# Patient Record
Sex: MALE | Race: WHITE | NOT HISPANIC OR LATINO | URBAN - METROPOLITAN AREA
[De-identification: names, ages, dates, MRNs, and addresses within clinical notes are randomized per-mention and may not be internally consistent; named-entity substitution may affect disease eponyms.]

---

## 2022-04-25 ENCOUNTER — ESTABLISHED PATIENT (OUTPATIENT)
Dept: URBAN - METROPOLITAN AREA CLINIC 21 | Facility: CLINIC | Age: 77
End: 2022-04-25

## 2022-04-25 DIAGNOSIS — H16.223: ICD-10-CM

## 2022-04-25 DIAGNOSIS — H43.813: ICD-10-CM

## 2022-04-25 PROCEDURE — 92014 COMPRE OPH EXAM EST PT 1/>: CPT

## 2022-04-25 PROCEDURE — 68761 CLOSE TEAR DUCT OPENING: CPT

## 2022-04-25 ASSESSMENT — VISUAL ACUITY
OU_CC: 20/50
OD_CC: 20/30-1
OS_CC: 20/20-1

## 2022-04-25 ASSESSMENT — TONOMETRY
OS_IOP_MMHG: 15
OD_IOP_MMHG: 15

## 2022-04-25 NOTE — PROCEDURE NOTE: CLINICAL
PROCEDURE NOTE: Punctal Plugs, Extended Bilateral Lower Lids. Diagnosis: Keratoconjunctivitis Sicca, Not Specified As Sjögren's. Prior to treatment, the risks/benefits/alternatives were discussed. The patient wished to proceed with procedure. Extended duration plugs were inserted. Mir John. Size: 0.3mm Location: * punctum. Patient tolerated procedure well. There were no complications. Post procedure instructions given. Rosa Lucia

## 2022-05-16 ENCOUNTER — ESTABLISHED PATIENT (OUTPATIENT)
Dept: URBAN - METROPOLITAN AREA CLINIC 21 | Facility: CLINIC | Age: 77
End: 2022-05-16

## 2022-05-16 DIAGNOSIS — H25.813: ICD-10-CM

## 2022-05-16 PROCEDURE — 92014 COMPRE OPH EXAM EST PT 1/>: CPT

## 2022-05-16 RX ORDER — LOTEPREDNOL ETABONATE 2 MG/ML: 1 SUSPENSION/ DROPS OPHTHALMIC TWICE A DAY

## 2022-05-16 ASSESSMENT — VISUAL ACUITY
OS_CC: 20/25+1
OD_CC: 20/25-1

## 2022-05-16 ASSESSMENT — TONOMETRY
OD_IOP_MMHG: 16
OS_IOP_MMHG: 17

## 2022-05-16 ASSESSMENT — KERATOMETRY
OS_K2POWER_DIOPTERS: 45.50
OS_AXISANGLE2_DEGREES: 104
OD_K2POWER_DIOPTERS: 46.25
OS_K1POWER_DIOPTERS: 44.00
OS_AXISANGLE_DEGREES: 014
OD_AXISANGLE2_DEGREES: 076
OD_K1POWER_DIOPTERS: 44.75
OD_AXISANGLE_DEGREES: 166

## 2022-06-20 ENCOUNTER — ESTABLISHED PATIENT (OUTPATIENT)
Dept: URBAN - METROPOLITAN AREA CLINIC 21 | Facility: CLINIC | Age: 77
End: 2022-06-20

## 2022-06-20 DIAGNOSIS — H25.813: ICD-10-CM

## 2022-06-20 DIAGNOSIS — H43.813: ICD-10-CM

## 2022-06-20 DIAGNOSIS — H16.223: ICD-10-CM

## 2022-06-20 PROCEDURE — 92014 COMPRE OPH EXAM EST PT 1/>: CPT

## 2022-06-20 ASSESSMENT — VISUAL ACUITY
OS_CC: 20/25-2
OU_CC: 20/30
OD_CC: 20/30-1

## 2022-06-20 ASSESSMENT — KERATOMETRY
OD_K2POWER_DIOPTERS: 46.25
OS_K1POWER_DIOPTERS: 44.00
OD_AXISANGLE_DEGREES: 166
OS_AXISANGLE2_DEGREES: 104
OS_K2POWER_DIOPTERS: 45.50
OS_AXISANGLE_DEGREES: 014
OD_K1POWER_DIOPTERS: 44.75
OD_AXISANGLE2_DEGREES: 076

## 2022-06-20 ASSESSMENT — TONOMETRY
OS_IOP_MMHG: 13
OD_IOP_MMHG: 18

## 2022-06-20 NOTE — PATIENT DISCUSSION
* MRX for glasses AND cls would be needed.  Patient likes wearing contacts more due to comfort and vision being better than glasses.

## 2022-06-20 NOTE — PATIENT DISCUSSION
* Pt will be seeing Dr. Alfred Membreno soon and  ------ can discuss whether or not he should continue wearing contacts due to Motility and memory issues due to Parkinsons.

## 2022-06-20 NOTE — PATIENT DISCUSSION
** The patient came in today wearing contacts-- and glasses.  TWO- THREE contacts were on the right eye today which was what caused the blurred vision and trouble seeing.  Once removed, the patient was able to see as usual.

## 2022-08-18 ENCOUNTER — FOLLOW UP (OUTPATIENT)
Dept: URBAN - METROPOLITAN AREA CLINIC 21 | Facility: CLINIC | Age: 77
End: 2022-08-18

## 2022-08-18 DIAGNOSIS — H52.4: ICD-10-CM

## 2022-08-18 DIAGNOSIS — H25.813: ICD-10-CM

## 2022-08-18 PROCEDURE — 92012 INTRM OPH EXAM EST PATIENT: CPT

## 2022-08-18 ASSESSMENT — TONOMETRY
OD_IOP_MMHG: 16
OS_IOP_MMHG: 14

## 2022-08-18 ASSESSMENT — VISUAL ACUITY
OU_CC: J1+
OS_CC: 20/30+3
OU_CC: 20/25-1
OD_CC: 20/20

## 2023-06-13 NOTE — PATIENT DISCUSSION
Retinal tear and detachment warning symptoms reviewed and patient instructed to call immediately if increasing floaters, flashes, or decreasing peripheral vision. Infliximab Pregnancy And Lactation Text: This medication is Pregnancy Category B and is considered safe during pregnancy. It is unknown if this medication is excreted in breast milk.